# Patient Record
Sex: FEMALE | Race: WHITE | Employment: OTHER | URBAN - METROPOLITAN AREA
[De-identification: names, ages, dates, MRNs, and addresses within clinical notes are randomized per-mention and may not be internally consistent; named-entity substitution may affect disease eponyms.]

---

## 2020-05-11 ENCOUNTER — HOSPITAL ENCOUNTER (EMERGENCY)
Age: 75
Discharge: HOME | End: 2020-05-11
Attending: EMERGENCY MEDICINE
Payer: MEDICARE

## 2020-05-11 ENCOUNTER — HOSPITAL ENCOUNTER (EMERGENCY)
Dept: CT IMAGING | Age: 75
Discharge: HOME | End: 2020-05-11
Attending: EMERGENCY MEDICINE
Payer: MEDICARE

## 2020-05-11 VITALS
DIASTOLIC BLOOD PRESSURE: 72 MMHG | TEMPERATURE: 98.6 F | RESPIRATION RATE: 16 BRPM | SYSTOLIC BLOOD PRESSURE: 190 MMHG | HEART RATE: 60 BPM | OXYGEN SATURATION: 97 %

## 2020-05-11 DIAGNOSIS — S02.2XXA CLOSED FRACTURE OF NASAL BONE, INITIAL ENCOUNTER: Primary | ICD-10-CM

## 2020-05-11 PROCEDURE — 99284 EMERGENCY DEPT VISIT MOD MDM: CPT

## 2020-05-11 PROCEDURE — 70486 CT MAXILLOFACIAL W/O DYE: CPT

## 2020-05-11 PROCEDURE — 72125 CT NECK SPINE W/O DYE: CPT

## 2020-05-11 PROCEDURE — 70450 CT HEAD/BRAIN W/O DYE: CPT

## 2020-05-11 PROCEDURE — 74011250637 HC RX REV CODE- 250/637: Performed by: EMERGENCY MEDICINE

## 2020-05-11 RX ORDER — OXYCODONE AND ACETAMINOPHEN 5; 325 MG/1; MG/1
1 TABLET ORAL
Qty: 20 TAB | Refills: 0 | Status: SHIPPED | OUTPATIENT
Start: 2020-05-11 | End: 2020-05-18

## 2020-05-11 RX ORDER — DEUTETRABENAZINE 12 MG/1
12 TABLET, COATED ORAL 2 TIMES DAILY
COMMUNITY

## 2020-05-11 RX ORDER — ACETAMINOPHEN 325 MG/1
650 TABLET ORAL
COMMUNITY

## 2020-05-11 RX ORDER — OXYCODONE HYDROCHLORIDE 5 MG/1
TABLET ORAL
COMMUNITY

## 2020-05-11 RX ORDER — FACIAL-BODY WIPES
10 EACH TOPICAL EVERY 24 HOURS
COMMUNITY

## 2020-05-11 RX ORDER — OXYCODONE HYDROCHLORIDE 5 MG/1
5 TABLET ORAL
Status: COMPLETED | OUTPATIENT
Start: 2020-05-11 | End: 2020-05-11

## 2020-05-11 RX ORDER — ACETAMINOPHEN 500 MG
1000 TABLET ORAL
Status: COMPLETED | OUTPATIENT
Start: 2020-05-11 | End: 2020-05-11

## 2020-05-11 RX ORDER — SERTRALINE HYDROCHLORIDE 100 MG/1
100 TABLET, FILM COATED ORAL DAILY
COMMUNITY

## 2020-05-11 RX ORDER — IPRATROPIUM BROMIDE 21 UG/1
2 SPRAY, METERED NASAL 3 TIMES DAILY
COMMUNITY

## 2020-05-11 RX ORDER — BENAZEPRIL HYDROCHLORIDE 20 MG/1
20 TABLET ORAL DAILY
COMMUNITY

## 2020-05-11 RX ORDER — OLANZAPINE 10 MG/1
10 TABLET ORAL 2 TIMES DAILY
COMMUNITY

## 2020-05-11 RX ADMIN — ACETAMINOPHEN 1000 MG: 500 TABLET ORAL at 20:31

## 2020-05-11 RX ADMIN — OXYCODONE 5 MG: 5 TABLET ORAL at 22:15

## 2020-05-11 NOTE — ED PROVIDER NOTES
EMERGENCY DEPARTMENT HISTORY AND PHYSICAL EXAM    Date: 5/11/2020  Patient Name: Néstor Hinson    History of Presenting Illness     Chief Complaint   Patient presents with    Fall         History Provided By: Patient    Additional History (Context):   7:49 PM  Néstor Hinson is a 67 y.o. female with PMHX of HTN, bipolar schizoaffective disorder who presents to the emergency department C/O facial injury after falling. She states that she is very unsteady on her feet and falls often. She denied any prodromal HA, CP, vertigo prior to her fall. She is unsure if she suffered LOC and complains only of facial pain and frontal headache. No N/V, no extremity pain, no visual changes, no neck pain. Social History  Denies smoking drinking or drugs      Family History  Uncertain per patient      PCP: James Frazier MD    Current Outpatient Medications   Medication Sig Dispense Refill    acetaminophen (TYLENOL) 325 mg tablet Take 650 mg by mouth every six (6) hours as needed for Pain.  deutetrabenazine (Austedo) 12 mg tab Take 12 mg by mouth two (2) times a day.  benazepriL (LOTENSIN) 20 mg tablet Take 20 mg by mouth daily.  bisacodyL (DULCOLAX) 10 mg suppository Insert 10 mg into rectum every twenty-four (24) hours. prn   Indications: constipation      ipratropium (ATROVENT) 0.03 % nasal spray 2 Sprays by Both Nostrils route three (3) times daily.  multivitamin (Multiple Vitamin-Minerals) tablet Take 1 Tab by mouth daily.  naloxone HCl (NALOXONE INJECTION) by Injection route as needed (excessive sedation due to opioid). Auto injection      OLANZapine (ZyPREXA) 10 mg tablet Take 10 mg by mouth two (2) times a day. Indications: schizophrenia      oxyCODONE IR (Roxicodone) 5 mg immediate release tablet Take  by mouth every six (6) hours as needed for Pain. 2.5-5 mg severe pain   Indications: pain      sertraline (ZOLOFT) 100 mg tablet Take 100 mg by mouth daily.       oxyCODONE-acetaminophen (Percocet) 5-325 mg per tablet Take 1 Tab by mouth every four (4) hours as needed for Pain for up to 7 days. Max Daily Amount: 6 Tabs. Take 1 tablet every 4-6 hours as needed for pain control. If you were instructed to try over the counter ibuprofen or tylenol, only take the percocet for pain not controlled with the over the counter medication. 20 Tab 0    guaiFENesin-dextromethorphan SR (Mucinex DM) 600-30 mg per tablet Take 1 Tab by mouth every twelve (12) hours as needed for Cough. 30 Tab 0       Past History     Past Medical History:  Past Medical History:   Diagnosis Date    Bipolar affective ([address]     Cardiac murmur     Major depressive disorder     Schizophrenia ([facility] [facility]     Tibial fracture        Past Surgical History:  Past Surgical History:   Procedure Laterality Date    HX CHOLECYSTECTOMY         Family History:  History reviewed. No pertinent family history. Social History:  Social History     Tobacco Use    Smoking status: Former Smoker    Smokeless tobacco: Never Used   Substance Use Topics    Alcohol use: Not Currently    Drug use: Not Currently       Allergies: Allergies   Allergen Reactions    Clonazepam Unknown (comments)         Review of Systems   Review of Systems   Constitutional: Negative for activity change and fatigue. HENT: Positive for facial swelling and rhinorrhea. Negative for nosebleeds. Eyes: Negative for discharge and visual disturbance. Respiratory: Negative for apnea, cough, chest tightness and shortness of breath. Cardiovascular: Negative for chest pain and palpitations. Neurological: Negative for facial asymmetry and headaches. Hematological: Does not bruise/bleed easily. Psychiatric/Behavioral: Positive for confusion and decreased concentration. Negative for self-injury. The patient is not nervous/anxious. Slowness and diminished concentration are chronic   All other systems reviewed and are negative.         Physical Exam     Vitals: 05/11/20 1922 05/11/20 2135   BP: 140/61 190/72   Pulse: 60    Resp: 16    Temp: 98.6 °F (37 °C)    SpO2: 97%      Physical Exam  Vitals signs and nursing note reviewed. Constitutional:       General: She is not in acute distress. Appearance: She is well-developed. She is not ill-appearing, toxic-appearing or diaphoretic. HENT:      Head: Normocephalic and atraumatic. Right Ear: Tympanic membrane, ear canal and external ear normal. No hemotympanum. Tympanic membrane is not injected. Left Ear: Tympanic membrane, ear canal and external ear normal. No hemotympanum. Tympanic membrane is not injected. Nose: Nasal deformity, signs of injury, nasal tenderness and mucosal edema present. No septal deviation, laceration or rhinorrhea. Right Nostril: No epistaxis or septal hematoma. Left Nostril: No epistaxis or septal hematoma. Right Sinus: Maxillary sinus tenderness and frontal sinus tenderness present. Left Sinus: Maxillary sinus tenderness and frontal sinus tenderness present. Comments: No septal hematoma, no intranasal bleeding     Mouth/Throat:      Mouth: No injury. Pharynx: No oropharyngeal exudate. Comments: No intraoral or dental injuries  Eyes:      General: Lids are normal. Vision grossly intact. No scleral icterus. Right eye: No discharge. Left eye: No discharge. Extraocular Movements:      Right eye: Normal extraocular motion and no nystagmus. Left eye: Normal extraocular motion and no nystagmus. Conjunctiva/sclera: Conjunctivae normal.      Right eye: No hemorrhage. Left eye: No hemorrhage. Pupils: Pupils are equal, round, and reactive to light. Comments: No pallor   Neck:      Musculoskeletal: Neck supple. Normal range of motion. No neck rigidity, pain with movement, spinous process tenderness or muscular tenderness. Thyroid: No thyromegaly. Vascular: No JVD. Trachea: No tracheal deviation. Cardiovascular:      Rate and Rhythm: Normal rate and regular rhythm. Heart sounds: Normal heart sounds. Pulmonary:      Effort: Pulmonary effort is normal. No respiratory distress. Breath sounds: Normal breath sounds. No stridor. Abdominal:      General: Bowel sounds are normal. There is no distension. Palpations: Abdomen is soft. Tenderness: There is no abdominal tenderness. There is no guarding or rebound. Musculoskeletal: Normal range of motion. General: No tenderness. Comments: No soft tissue injuries   Lymphadenopathy:      Cervical: No cervical adenopathy. Skin:     General: Skin is warm and dry. Findings: No erythema or rash. Neurological:      Mental Status: She is alert and oriented to person, place, and time. Cranial Nerves: No cranial nerve deficit. Coordination: Coordination normal.      Deep Tendon Reflexes: Reflexes are normal and symmetric. Reflexes normal.   Psychiatric:         Behavior: Behavior normal.         Thought Content: Thought content normal.         Judgment: Judgment normal.         Diagnostic Study Results     Labs -   No results found for this or any previous visit (from the past 12 hour(s)). Radiologic Studies -   CT HEAD WO CONT   Final Result   IMPRESSION:      No acute intracranial abnormalities. CT MAXILLOFACIAL WO CONT   Final Result   IMPRESSION:      Comminuted left nasal bone fracture with soft tissue swelling of the nose and   left cheek. CT SPINE CERV WO CONT   Final Result   IMPRESSION:      No acute fractures or listhesis. Multilevel spondylosis and spinal stenosis as described.         CT Results  (Last 48 hours)    None        CXR Results  (Last 48 hours)    None          Medications given in the ED-  Medications   acetaminophen (TYLENOL) tablet 1,000 mg (1,000 mg Oral Given 5/11/20 2031)   oxyCODONE IR (ROXICODONE) tablet 5 mg (5 mg Oral Given 5/11/20 2215)         Medical Decision Making   I am the first provider for this patient. I reviewed the vital signs, available nursing notes, past medical history, past surgical history, family history and social history. Vital Signs-Reviewed the patient's vital signs. Pulse Oximetry Analysis - 97% on room air      --------------------------------------------------------  Records Reviewed: NURSING NOTES AND PREVIOUS MEDICAL RECORDS    CTA HEAD and NECK W/WO CONTRAST    COMPLETED DATE/TIME:  10/29/2019 1:40 pm    REASON FOR EXAM:  slured speech; slurred speech    COMPARISON:  None    RADIATION DOSE:  DLP 2356 mGy x cm. Dose reduction technique using automated exposure  control. TECHNIQUE:  Computed tomography of the brain, neck and upper chest was done with scans  being obtained from the mid calvarial region to the top of the aortic arch  transaxially. Scans were obtained in conjunction with intravenous administration of non-  ionic contrast.  Rapid bolus intravenous contrast administration was  performed in time association with the axial images in order to provide  maximum vascular opacification for a volumetric data set serving as a  source for 3-D angiographic images post processing utilizing MIPP, thin  section MIPP, and/or surface shaded renderings.  Two-dimensional and three-  dimensional reconstructions were obtained on a workstation in multiple  projections to assess the vascular anatomy. REPORT:  CT BRAIN:  No intracranial mass, edema or hemorrhage is seen. Abby Velez is no epidural or  subdural fluid collection.  No abnormal contrast enhancement is seen in the  brain.  No abnormality of the base of the skull or calvarium is seen. There is no evidence of sinusitis or mastoiditis. AORTIC ARCH: Origins of the great vessels are unremarkable.  No arch  anomalies are noted. RIGHT CAROTID ARTERY: Abby Velez is about 30% diameter stenosis in the origin  of the right internal carotid artery.     LEFT CAROTID ARTERY:  No carotid artery stenosis is seen on the left. VERTEBRAL ARTERIES:  The vertebral arteries are patent bilaterally with no  stenosis evident.  The left vertebral artery is dominant. INTRACRANIAL CIRCULATION:  The distal internal carotid arteries and basilar  artery have normal caliber.  No significant abnormality of the distal  vertebral arteries is visualized. No significant vessel stenosis is  identified within the [place].  The posterior communicating  arteries are not visualized and may be developmentally absent. No intracranial vascular occlusion is seen.  The right A1 segment is  smaller in caliber than the left. There is focal stenosis in the distal aspect of the right M1 segment. There is focal stenosis in the proximal and distal aspects of the right P1  segment. No aneurysm or significant vascular malformation is identified by CT  angiography at the level of the [place]. No venous circulation abnormalities are noted. Complete ECHO - JAN 9, 2020  · There is mild concentric LV hypertrophy with normal LV systolic   function, EF 65 - 70%, and mild diastolic dysfunction. No dynamic outflow   tract obstruction  · Mild mitral annular calcification  · Very mild aortic valve sclerosis without stenosis  · Typical diastolic filling pattern for this age group  · Normal right heart    Provider Notes (Medical Decision Making):   Extensive previous work ups for arrhythmias, valvular heart disease and CHF have all shown minimal changes, unlikely to be contribution to her falls, episode today does not seem to suggest need for another workup, but to examine for injuries from the fall. TC Head, face, C-spine due to age, although she does pass NEXUS criteria (her affect and demeanor do not seem to suggest long term baseline of the standard population, her age and psych dx lead one to scan for neck injuries though she meets criteria by definition.   By exam, there is no evidence of an open nasal bone fracture so no abx started at this time. Procedures:  Procedures    ED Course:   7:49 PM: Initial assessment performed. The patients presenting problems have been discussed, and they are in agreement with the care plan formulated and outlined with them. I have encouraged them to ask questions as they arise throughout their visit. Diagnosis and Disposition       DISCHARGE NOTE:  9:20 PM  Alex Lopes  results have been reviewed with her. She has been counseled regarding her diagnosis, treatment, and plan. She verbally conveys understanding and agreement of the signs, symptoms, diagnosis, treatment and prognosis and additionally agrees to follow up as discussed. She also agrees with the care-plan and conveys that all of her questions have been answered. I have also provided discharge instructions for her that include: educational information regarding their diagnosis and treatment, and list of reasons why they would want to return to the ED prior to their follow-up appointment, should her condition change. She has been provided with education for proper emergency department utilization. CLINICAL IMPRESSION:    1. Closed fracture of nasal bone, initial encounter        PLAN:  1. D/C Home  2. Discharge Medication List as of 5/11/2020  9:40 PM      START taking these medications    Details   oxyCODONE-acetaminophen (Percocet) 5-325 mg per tablet Take 1 Tab by mouth every four (4) hours as needed for Pain for up to 7 days. Max Daily Amount: 6 Tabs. Take 1 tablet every 4-6 hours as needed for pain control. If you were instructed to try over the counter ibuprofen or tylenol, only take the percocet  for pain not controlled with the over the counter medication. , Print, Disp-20 Tab, R-0      guaiFENesin-dextromethorphan SR (Mucinex DM) 600-30 mg per tablet Take 1 Tab by mouth every twelve (12) hours as needed for Cough. , Print, Disp-30 Tab, R-0         CONTINUE these medications which have NOT CHANGED    Details acetaminophen (TYLENOL) 325 mg tablet Take 650 mg by mouth every six (6) hours as needed for Pain., Historical Med      deutetrabenazine (Austedo) 12 mg tab Take 12 mg by mouth two (2) times a day., Historical Med      benazepriL (LOTENSIN) 20 mg tablet Take 20 mg by mouth daily. , Historical Med      bisacodyL (DULCOLAX) 10 mg suppository Insert 10 mg into rectum every twenty-four (24) hours. prn   Indications: constipation, Historical Med      ipratropium (ATROVENT) 0.03 % nasal spray 2 Sprays by Both Nostrils route three (3) times daily. , Historical Med      multivitamin (Multiple Vitamin-Minerals) tablet Take 1 Tab by mouth daily. , Historical Med      naloxone HCl (NALOXONE INJECTION) by Injection route as needed (excessive sedation due to opioid). Auto injection, Historical Med      OLANZapine (ZyPREXA) 10 mg tablet Take 10 mg by mouth two (2) times a day. Indications: schizophrenia, Historical Med      oxyCODONE IR (Roxicodone) 5 mg immediate release tablet Take  by mouth every six (6) hours as needed for Pain. 2.5-5 mg severe pain   Indications: pain, Historical Med      sertraline (ZOLOFT) 100 mg tablet Take 100 mg by mouth daily. , Historical Med           3. Follow-up Information     Follow up With Specialties Details Why Contact Info    Leroy Frazier MD [facility] In 1 week  [address] [address] [address] [place] [address] [place]      Renetta Yan MD Otolaryngology, Surgery In 1 week  [address] [place] [id number]  [phone]      [facility] EMERGENCY DEPT Emergency Medicine  As needed [address] [address] [phone]        _______________________________    This note was partially transcribed via voice recognition software. Although efforts have been made to catch any discrepancies, it may contain sound alike words, grammatical errors, or nonsensical words.

## 2020-05-11 NOTE — ED TRIAGE NOTES
Per EMS report pt fell face forward approx 30 min pta at the [facility]. Pt states she slipped and fell. states pain in nose. Pt has brushing, swelling,  and sl abrasion to the bridge of her nose. States pain to right foot/boot in place. States hx of ankle, unsure as to when. Alert and conversing.

## 2020-05-12 NOTE — ED NOTES
Dr. Chery Head aware of pt b/p 190/72. States pt may be d/c'd home, but that we will medicate for pain prior to discharge. Pt is asymptomatic with no neurological changes or c/o headache.

## 2020-05-12 NOTE — ED NOTES
D/c'd home via [facility]. Pt is alert, conversing and in NAd. Report was called to [facility], nurse at [facility]. She is aware of fx. Nose impending return and medications oxycodone and tylenol given. D/c papers were sent with the patient as well. She is alert and in NAD at discharge.

## 2020-05-12 NOTE — DISCHARGE INSTRUCTIONS
Patient Education        Facial Fracture: Care Instructions  Your Care Instructions  You have broken (fractured) one or more bones in your face. Swelling and bruising from the injury are likely to get worse over the first couple of days. After that, the swelling should steadily improve until it is gone. If you have bruises on your face, they may change as they heal. The skin may turn from black and blue to green to yellow or brown before it returns to its normal color. It may take several weeks for your injury to heal.  It is very important that you get follow-up care as directed so that the injury heals properly and does not lead to problems. The kind of care and treatment you will need depends on the specific type of break (or breaks) you have. You heal best when you take good care of yourself. Eat a variety of healthy foods, and don't smoke. Follow-up care is a key part of your treatment and safety. Be sure to make and go to all appointments, and call your doctor if you are having problems. It's also a good idea to know your test results and keep a list of the medicines you take. How can you care for yourself at home? · Put ice or a cold pack on your injury for 10 to 20 minutes at a time. Try to do this every 1 to 2 hours for the next 3 days (when you are awake) or until the swelling goes down. Put a thin cloth between the ice pack and your skin. · Go to all follow-up appointments with your doctor. Your doctor will determine whether you need further treatment, including surgery. · Take your medicines exactly as prescribed. Call your doctor if you think you are having a problem with your medicine. You will get more details on the specific medicines your doctor prescribes. · If your doctor prescribed antibiotics, take them exactly as directed. Do not stop taking them just because you feel better. You need to take the full course of antibiotics. · Be safe with medicines.  Read and follow all instructions on the label. ? If the doctor gave you a prescription medicine for pain, take it as prescribed. ? If you are not taking a prescription pain medicine, ask your doctor if you can take an over-the-counter medicine. · Keep your head elevated when you sleep. · Eat soft food to decrease jaw pain. · Do not blow your nose. Dab it with a tissue if you need to. When should you call for help? Call 911 anytime you think you may need emergency care. For example, call if:    · You have a seizure.     · You passed out (lost consciousness).     · You have tingling, weakness, or numbness on one side of your body.    Call your doctor now or seek immediate medical care if:    · You have a severe headache.     · You develop double vision.     · You have a fever and stiff neck.     · Clear, watery fluid drains from your nose.     · You feel dizzy or lightheaded.     · You have new eye pain or changes in your vision, such as blurring.     · You have new ear pain, ringing in your ears, or trouble hearing.     · You are confused, irritable, or not acting normally.     · You have a hard time standing, walking, or talking.     · You have new mouth or tooth pain, or you have trouble chewing.     · You have increasing pain even after you have taken your pain medicine.    Watch closely for changes in your health, and be sure to contact your doctor if:    · You develop a cough, cold, or sinus infection.     · The symptoms from your injury are not steadily improving. Where can you learn more? Go to http://waldo-luis.info/  Enter M349 in the search box to learn more about \"Facial Fracture: Care Instructions. \"  Current as of: November 19, 2019Content Version: 12.4  © 2448-0472 Healthwise, Incorporated. Care instructions adapted under license by [employer] (which disclaims liability or warranty for this information).  If you have questions about a medical condition or this instruction, always ask your healthcare professional. [address] [address] any warranty or liability for your use of this information. Patient Education        Broken Nose: Care Instructions  Your Care Instructions    A broken nose is a break, or fracture, of the bone or cartilage. Most broken noses need only home care and a follow-up visit with a doctor. The swelling should go down in a few days. Bruises around your eyes and nose should go away in 2 to 3 weeks. You heal best when you take good care of yourself. Eat a variety of healthy foods, and don't smoke. Follow-up care is a key part of your treatment and safety. Be sure to make and go to all appointments, and call your doctor if you are having problems. It's also a good idea to know your test results and keep a list of the medicines you take. How can you care for yourself at home? · If you have a nasal splint or packing, leave it in place until a doctor removes it. · If your doctor prescribed antibiotics, take them as directed. Do not stop taking them just because you feel better. You need to take the full course of antibiotics. · Take decongestants as directed to help you breathe after the splint or packing is removed. Your doctor may give you a prescription or suggest over-the-counter medicine. · Be safe with medicines. Take pain medicines exactly as directed. ? If the doctor gave you a prescription medicine for pain, take it as prescribed. ? If you are not taking a prescription pain medicine, ask your doctor if you can take an over-the-counter medicine. · Put ice or a cold pack on your nose for 10 to 20 minutes at a time. Try to do this every 1 to 2 hours for the first 3 days (when you are awake) or until the swelling goes down. Put a thin cloth between the ice pack and your skin. · Sleep with your head slightly raised until the swelling goes down. Prop up your head and shoulders on pillows. · Do not play contact sports for 6 weeks.   When should you call for help? Call 911 anytime you think you may need emergency care. For example, call if:    · You have trouble breathing.     · You passed out (lost consciousness).    Call your doctor now or seek immediate medical care if:    · You have signs of infection, such as:  ? Increased pain, swelling, warmth, or redness. ? Red streaks leading from the area. ? Pus draining from the area. ? A fever.     · You have clear fluid draining from your nose.     · You have vision changes.     · Your nose is bleeding.     · You have new or worse pain.    Watch closely for changes in your health, and be sure to contact your doctor if:    · You do not get better as expected. Where can you learn more? Go to http://waldo-luis.info/  Enter Y345 in the search box to learn more about \"Broken Nose: Care Instructions. \"  Current as of: June 26, 2019Content Version: 12.4  © 4091-0028 Healthwise, Incorporated. Care instructions adapted under license by [employer] (which disclaims liability or warranty for this information). If you have questions about a medical condition or this instruction, always ask your healthcare professional. [address] [address] any warranty or liability for your use of this information.

## 2020-05-18 ENCOUNTER — HOSPITAL ENCOUNTER (OUTPATIENT)
Dept: CT IMAGING | Age: 75
Discharge: HOME | End: 2020-05-18
Attending: EMERGENCY MEDICINE
Payer: MEDICARE

## 2020-05-18 ENCOUNTER — HOSPITAL ENCOUNTER (EMERGENCY)
Age: 75
Discharge: HOME | End: 2020-05-18
Attending: EMERGENCY MEDICINE
Payer: MEDICARE

## 2020-05-18 VITALS
RESPIRATION RATE: 18 BRPM | DIASTOLIC BLOOD PRESSURE: 95 MMHG | HEART RATE: 76 BPM | BODY MASS INDEX: 26.81 KG/M2 | TEMPERATURE: 98.3 F | HEIGHT: 61 IN | WEIGHT: 142 LBS | SYSTOLIC BLOOD PRESSURE: 151 MMHG | OXYGEN SATURATION: 98 %
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
## 2020-05-18 DIAGNOSIS — S01.81XA FOREHEAD LACERATION, INITIAL ENCOUNTER: ICD-10-CM

## 2020-05-18 PROCEDURE — 90471 IMMUNIZATION ADMIN: CPT

## 2020-05-18 PROCEDURE — 77030018836 HC SOL IRR NACL ICUM -A

## 2020-05-18 PROCEDURE — 90715 TDAP VACCINE 7 YRS/> IM: CPT | Performed by: EMERGENCY MEDICINE

## 2020-05-18 PROCEDURE — 70450 CT HEAD/BRAIN W/O DYE: CPT

## 2020-05-18 PROCEDURE — 75810000293 HC SIMP/SUPERF WND  RPR

## 2020-05-18 PROCEDURE — 99284 EMERGENCY DEPT VISIT MOD MDM: CPT

## 2020-05-18 PROCEDURE — 74011000250 HC RX REV CODE- 250: Performed by: PHYSICIAN ASSISTANT

## 2020-05-18 PROCEDURE — 74011250637 HC RX REV CODE- 250/637: Performed by: EMERGENCY MEDICINE

## 2020-05-18 PROCEDURE — 74011250636 HC RX REV CODE- 250/636: Performed by: EMERGENCY MEDICINE

## 2020-05-18 PROCEDURE — 77030002916 HC SUT ETHLN J&J -A

## 2020-05-18 RX ORDER — LIDOCAINE HYDROCHLORIDE AND EPINEPHRINE 10; 10 MG/ML; UG/ML
10 INJECTION, SOLUTION INFILTRATION; PERINEURAL ONCE
Status: DISCONTINUED | OUTPATIENT
Start: 2020-05-18 | End: 2020-05-18 | Stop reason: HOSPADM

## 2020-05-18 RX ORDER — ACETAMINOPHEN 325 MG/1
650 TABLET ORAL ONCE
Status: COMPLETED | OUTPATIENT
Start: 2020-05-18 | End: 2020-05-18

## 2020-05-18 RX ORDER — BACITRACIN 500 [USP'U]/G
OINTMENT TOPICAL
Status: COMPLETED | OUTPATIENT
Start: 2020-05-18 | End: 2020-05-18

## 2020-05-18 RX ADMIN — BACITRACIN: 500 OINTMENT TOPICAL at 16:10

## 2020-05-18 RX ADMIN — ACETAMINOPHEN 650 MG: 325 TABLET ORAL at 16:09

## 2020-05-18 RX ADMIN — TETANUS TOXOID, REDUCED DIPHTHERIA TOXOID AND ACELLULAR PERTUSSIS VACCINE, ADSORBED 0.5 ML: 5; 2.5; 8; 8; 2.5 SUSPENSION INTRAMUSCULAR at 17:36

## 2020-05-18 NOTE — ED TRIAGE NOTES
Pt reports to ED via EMS for a fall. EMS reports last week she fell and broke her nose and today she fell and has a laceration above the right eye. EMS reports that there was no LOC, but she did hit her head on the ground. Pt reports a pain level of 8/10. She is ANO X4 and is speaking in clear/full sentences.

## 2020-05-18 NOTE — ED PROVIDER NOTES
EMERGENCY DEPARTMENT HISTORY AND PHYSICAL EXAM    Date: 5/18/2020  Patient Name: Za Naik    History of Presenting Illness     Chief Complaint   Patient presents with    Fall         History Provided By: Patient and EMS    2:04 PM  Za Naik is a 67 y.o. female with PMHX of frequent falls who presents to the emergency department C/O head wound. Per patient she slipped and fell today and struck her head on the ground. She reports she is wearing a boot on her right leg from a fall last week which she broke her nose in the boot causes her to slip and fall frequently. She denies any loss of consciousness, neck pain, back pain, numbness or weakness. She does not take any blood thinners. She is not sure when her last tetanus shot was. She denies any other complaints. PCP: Beto Miller MD    Current Facility-Administered Medications   Medication Dose Route Frequency Provider Last Rate Last Dose    diph,Pertuss(AC),Tet Vac-PF (BOOSTRIX) suspension 0.5 mL  0.5 mL IntraMUSCular PRIOR TO DISCHARGE Riri Rodriguez MD        lidocaine-EPINEPHrine (XYLOCAINE) 1 %-1:100,000 injection 100 mg  10 mL IntraDERMal ONCE Riri Rodriguez MD         Current Outpatient Medications   Medication Sig Dispense Refill    acetaminophen (TYLENOL) 325 mg tablet Take 650 mg by mouth every six (6) hours as needed for Pain.  deutetrabenazine (Austedo) 12 mg tab Take 12 mg by mouth two (2) times a day.  benazepriL (LOTENSIN) 20 mg tablet Take 20 mg by mouth daily.  bisacodyL (DULCOLAX) 10 mg suppository Insert 10 mg into rectum every twenty-four (24) hours. prn   Indications: constipation      ipratropium (ATROVENT) 0.03 % nasal spray 2 Sprays by Both Nostrils route three (3) times daily.  multivitamin (Multiple Vitamin-Minerals) tablet Take 1 Tab by mouth daily.  naloxone HCl (NALOXONE INJECTION) by Injection route as needed (excessive sedation due to opioid).  Auto injection      OLANZapine (ZyPREXA) 10 mg tablet Take 10 mg by mouth two (2) times a day. Indications: schizophrenia      oxyCODONE IR (Roxicodone) 5 mg immediate release tablet Take  by mouth every six (6) hours as needed for Pain. 2.5-5 mg severe pain   Indications: pain      sertraline (ZOLOFT) 100 mg tablet Take 100 mg by mouth daily.  oxyCODONE-acetaminophen (Percocet) 5-325 mg per tablet Take 1 Tab by mouth every four (4) hours as needed for Pain for up to 7 days. Max Daily Amount: 6 Tabs. Take 1 tablet every 4-6 hours as needed for pain control. If you were instructed to try over the counter ibuprofen or tylenol, only take the percocet for pain not controlled with the over the counter medication. 20 Tab 0    guaiFENesin-dextromethorphan SR (Mucinex DM) 600-30 mg per tablet Take 1 Tab by mouth every twelve (12) hours as needed for Cough. 30 Tab 0       Past History     Past Medical History:  Past Medical History:   Diagnosis Date    Bipolar affective ([facility] [facility]     Cardiac murmur     Major depressive disorder     Schizophrenia ([facility]     Tibial fracture        Past Surgical History:  Past Surgical History:   Procedure Laterality Date    HX CHOLECYSTECTOMY         Family History:  No family history on file. Social History:  Social History     Tobacco Use    Smoking status: Former Smoker    Smokeless tobacco: Never Used   Substance Use Topics    Alcohol use: Not Currently    Drug use: Not Currently       Allergies: Allergies   Allergen Reactions    Clonazepam Unknown (comments)         Review of Systems   Review of Systems   Constitutional: Negative for fever. Respiratory: Negative for shortness of breath. Cardiovascular: Negative for chest pain. Gastrointestinal: Negative for abdominal pain. Skin: Positive for wound. Neurological: Negative for syncope. All other systems reviewed and are negative.         Physical Exam     Vitals:    05/18/20 1410   BP: 158/53   Pulse: 76   Resp: 18   Temp: 98.3 °F (36.8 °C)   SpO2: 96%   Weight: 64.4 kg (142 lb)   Height: 5' 1\" (1.549 m)     Physical Exam    Nursing notes and vital signs reviewed    Constitutional: Non toxic appearing, no acute distress  Head: Normocephalic, 3 cm laceration above the right eyebrow  Eyes: Pupils are equal, round, and reactive to light, EOMI  Neck: Supple, no spinal tenderness to palpation  Cardiovascular: Regular rate and rhythm, no murmurs, rubs, or gallops  Chest: Normal work of breathing and chest excursion bilaterally  Lungs: Clear to ausculation bilaterally  Abdomen: Soft, non tender, non distended  Back: No evidence of trauma or deformity, no spinal tenderness to palpation  Extremities: Lower extremity with Cam boot in place  Skin: Warm and dry, normal cap refill  Neuro: Alert and appropriate, CN intact, normal speech, strength and sensation symmetric bilaterally, normal coordination  Psychiatric: Normal mood and affect      Diagnostic Study Results     Labs -   No results found for this or any previous visit (from the past 12 hour(s)). Radiologic Studies -   CT HEAD WO CONT   Final Result   IMPRESSION:      No acute intracranial abnormalities. CT Results  (Last 48 hours)               05/18/20 1622  CT HEAD WO CONT Final result    Impression:  IMPRESSION:       No acute intracranial abnormalities. Narrative:  EXAM: CT head       INDICATION: Head trauma, headache       COMPARISON: None. TECHNIQUE: Axial CT imaging of the head was performed without intravenous   contrast. One or more dose reduction techniques were used on this CT: automated   exposure control, adjustment of the mAs and/or kVp according to patient size,   and iterative reconstruction techniques. The specific techniques used on this   CT exam have been documented in the patient's electronic medical record.  Digital   Imaging and Communications in Medicine (DICOM) format image data are available   to nonaffiliated external healthcare facilities or entities on a secure, media   free, reciprocally searchable basis with patient authorization for at least a   12-month period after this study. _______________       FINDINGS:       BRAIN AND POSTERIOR FOSSA: The sulci, folia, ventricles and basal cisterns are   within normal limits for the patient's age. There is no intracranial hemorrhage,   mass effect, or midline shift. There are no areas of abnormal parenchymal   attenuation. EXTRA-AXIAL SPACES AND MENINGES: There are no abnormal extra-axial fluid   collections. CALVARIUM: Intact. SINUSES: Clear. OTHER: None.       _______________               CXR Results  (Last 48 hours)    None          Medications given in the ED-  Medications   diph,Pertuss(AC),Tet Vac-PF (BOOSTRIX) suspension 0.5 mL (has no administration in time range)   lidocaine-EPINEPHrine (XYLOCAINE) 1 %-1:100,000 injection 100 mg (has no administration in time range)   bacitracin 500 unit/gram ointment ( Topical Given 5/18/20 1610)   acetaminophen (TYLENOL) tablet 650 mg (650 mg Oral Given 5/18/20 1609)         Medical Decision Making   I am the first provider for this patient. I reviewed the vital signs, available nursing notes, past medical history, past surgical history, family history and social history. Vital Signs-Reviewed the patient's vital signs. Records Reviewed: Nursing Notes and Old Medical Records    Provider Notes (Medical Decision Making): Sirena Arenas is a 67 y.o. female presenting after mechanical fall resulting in a forehead laceration. Patient is not on blood thinners and CT without acute abnormality. Neurologically intact on exam.  Wound was cleaned and repaired and tetanus updated. Plan for discharge with early primary care follow-up, return in 7 days for suture removal, and return precautions.     Procedures:  Wound Repair  Date/Time: 5/18/2020 3:20 PM  Supervising provider: Dr. Theresa Mcnally  Preparation: sterile field established and skin prepped with Mojgan  Location details: scalp  Wound length:2.6 - 7.5 cm  Anesthesia: local infiltration    Anesthesia:  Local Anesthetic: lidocaine 1% with epinephrine  Anesthetic total: 3 mL  Foreign bodies: no foreign bodies  Irrigation solution: saline  Irrigation method: syringe  Debridement: none  Skin closure: 5-0 nylon  Number of sutures: 7  Technique: simple  Approximation: close  Dressing: antibiotic ointment and gauze roll  Patient tolerance: Patient tolerated the procedure well with no immediate complications  My total time at bedside, performing this procedure was 1-15 minutes. ED Course:       Diagnosis and Disposition     Critical Care: None    DISCHARGE NOTE:    Tina Long  results have been reviewed with her. She has been counseled regarding her diagnosis, treatment, and plan. She verbally conveys understanding and agreement of the signs, symptoms, diagnosis, treatment and prognosis and additionally agrees to follow up as discussed. She also agrees with the care-plan and conveys that all of her questions have been answered. I have also provided discharge instructions for her that include: educational information regarding their diagnosis and treatment, and list of reasons why they would want to return to the ED prior to their follow-up appointment, should her condition change. She has been provided with education for proper emergency department utilization. CLINICAL IMPRESSION:    1. Fall, initial encounter    2. Forehead laceration, initial encounter        PLAN:  1. D/C Home  2. Current Discharge Medication List        3.    Follow-up Information     Follow up With Specialties Details Why Contact Info    Erwin Crews MD Family Practice Schedule an appointment as soon as possible for a visit  [address] [address]  [phone]      THE [facility] EMERGENCY DEPT Emergency Medicine In 7 days For suture removal [address] [facility] [address]  [phone] _______________________________      Please note that this dictation was completed with [employer], the computer voice recognition software. Quite often unanticipated grammatical, syntax, homophones, and other interpretive errors are inadvertently transcribed by the computer software. Please disregard these errors. Please excuse any errors that have escaped final proofreading.

## 2022-12-15 ENCOUNTER — HOSPITAL ENCOUNTER (EMERGENCY)
Age: 77
Discharge: NURSING, REHAB OR LONG-TERM CARE | End: 2022-12-15
Attending: EMERGENCY MEDICINE
Payer: MEDICARE

## 2022-12-15 ENCOUNTER — APPOINTMENT (OUTPATIENT)
Dept: GENERAL RADIOLOGY | Age: 77
End: 2022-12-15
Attending: PHYSICIAN ASSISTANT
Payer: MEDICARE

## 2022-12-15 VITALS
BODY MASS INDEX: 18.88 KG/M2 | OXYGEN SATURATION: 97 % | TEMPERATURE: 97.6 F | DIASTOLIC BLOOD PRESSURE: 101 MMHG | SYSTOLIC BLOOD PRESSURE: 146 MMHG | HEART RATE: 100 BPM | WEIGHT: 100 LBS | RESPIRATION RATE: 20 BRPM | HEIGHT: 61 IN

## 2022-12-15 DIAGNOSIS — R09.89 CHOKING EPISODE: Primary | ICD-10-CM

## 2022-12-15 PROCEDURE — 99283 EMERGENCY DEPT VISIT LOW MDM: CPT

## 2022-12-15 PROCEDURE — 71046 X-RAY EXAM CHEST 2 VIEWS: CPT

## 2022-12-15 RX ORDER — ALENDRONATE SODIUM 70 MG/1
TABLET ORAL
COMMUNITY

## 2022-12-15 RX ORDER — BENZTROPINE MESYLATE 0.5 MG/1
0.5 TABLET ORAL
COMMUNITY

## 2022-12-15 RX ORDER — ZIPRASIDONE HYDROCHLORIDE 60 MG/1
60 CAPSULE ORAL 2 TIMES DAILY WITH MEALS
COMMUNITY

## 2022-12-15 RX ORDER — LOPERAMIDE HYDROCHLORIDE 2 MG/1
CAPSULE ORAL
COMMUNITY

## 2022-12-15 RX ORDER — ATORVASTATIN CALCIUM 80 MG/1
80 TABLET, FILM COATED ORAL DAILY
COMMUNITY

## 2022-12-15 RX ORDER — AMLODIPINE BESYLATE 5 MG/1
5 TABLET ORAL DAILY
COMMUNITY

## 2022-12-15 RX ORDER — CLONIDINE HYDROCHLORIDE 0.1 MG/1
TABLET ORAL 2 TIMES DAILY
COMMUNITY

## 2022-12-15 RX ORDER — LANOLIN ALCOHOL/MO/W.PET/CERES
3 CREAM (GRAM) TOPICAL
COMMUNITY

## 2022-12-15 RX ORDER — ASPIRIN 325 MG
325 TABLET ORAL DAILY
COMMUNITY

## 2022-12-15 RX ORDER — TRAZODONE HYDROCHLORIDE 100 MG/1
100 TABLET ORAL ONCE
COMMUNITY

## 2022-12-15 NOTE — DISCHARGE INSTRUCTIONS
Your evaluation here in the ER today did not show any continuing concerns after episode of choking earlier today.   Chew your food well,  If you develop shortness of breath, chest pain, cough or fever you should return for reevaluation  Return to ER if any new concerns

## 2022-12-15 NOTE — ED PROVIDER NOTES
EMERGENCY DEPARTMENT HISTORY & PHYSICAL EXAM    THE [facility] EMERGENCY DEPT  12/15/2022, 2:35 PM    Clinical Impression:  1. Choking episode        Assessment/Differential Diagnosis:     Ddx choking episode, aspiration pneumonia, esophageal foreign body all considered    ED Course:   Initial assessment performed. The patients presenting problems have been discussed, and they are in agreement with the care plan formulated and outlined with them. I have encouraged them to ask questions as they arise throughout their visit. Patient comes from local nursing home. At lunch she was sitting eating a ham sandwich. She began to choke. Yolislich procedure done X 1 by staff, with clearance of food. There was no loss of consciousness. As a precaution she was sent here for evaluation. Patient is having no symptoms at this time. She denies sore throat, shortness of breath, chest pain or abdominal pain. She feels her breathing is normal.    Exam with patient resting comfortably. Mouth with no signs of trauma or foreign body. No pain with palpation to the neck. Lungs are clear to auscultation bilaterally. No pain with palpation to the abdomen    Will give p.o. challenge  Chest x-ray with no concerning acute findings  Plan is to send patient back to her residence/nursing home. Precautions discussed         Medical Chart Review:  I have reviewed triage nursing documentation. Review of old medical records with the following pertinent information:       Disposition:  nursing home/residence, good condition. Chief Complaint   Patient presents with    Choking     HPI:    The history is provided by patient and staff. No  used. Lennox Pian is a 65 y.o. female presenting to the Emergency Department with complaints of episode of choking. Patient was sitting eating a ham sandwich. She began to choke on a piece of ham.  Patient did not become unresponsive.   Yolislich done by staff X 1 with clearance of food. transported here out of an abundance of caution. She denies any shortness of breath, cough, abdominal pain. I have reviewed all PMHX, FMHX and Social Hx as entered into the medical record in the chart below using the Epic Template. Review of Systems:  Constitutional: neg for fever, chills  ENT:  neg for URI symptoms  Respiratory:  + for cough, positive for shortness of breath- both with choking episode, resolved  Cardiovascular:  negative for chest pain. Neg for pedal/LE edema. GI:  neg for abdominal pain. No n/v/d.  :  No urinary symptoms. No Flank pain. MSK: neg for back pain. Integumentary: no rashes, or skin trauma  Neurological: neg for headaches  All other systems reviewed negative with exception of positives in ROS and HPI. Past Medical History:  Past Medical History:   Diagnosis Date    Bipolar affective ([facility] [address]     Cardiac murmur     Major depressive disorder     Schizophrenia ([facility] [address]     Tibial fracture        Past Surgical History:  Past Surgical History:   Procedure Laterality Date    HX CHOLECYSTECTOMY         Family History:  No family history on file. Social History:  Social History     Tobacco Use    Smoking status: Former    Smokeless tobacco: Never   Substance Use Topics    Alcohol use: Not Currently    Drug use: Not Currently       Allergies: Allergies   Allergen Reactions    Clonazepam Unknown (comments)       Vital Signs:  Vitals:    12/15/22 1331 12/15/22 1428 12/15/22 1437 12/15/22 1443   BP:   121/76 (!) 121/55   Pulse: (!) 2 82  61   Resp:    16   Temp: 97.6 °F (36.4 °C)      SpO2:    97%   Weight:       Height:         Physical Exam:  Vital Signs Reviewed. Nursing Notes Reviewed. Constitutional:  thin, frail, elderly female. Appearance and behavior are age and situation appropriate. Head: Normocephalic, Atraumatic  Eyes: Conjunctiva clear, lids normal. Sclera anicteric.    ENT:hearing grossly intact, throat clear   Neck:  supple, nontender  Lungs: No respiratory distress. Lungs CTAB . no cough. no pain with inspiration  CV:  RR&R without harsh systolic murmur  Thorax: no chest wall tenderness. No signs of trauma. Skin without rash. Abdomen: no tenderness with palpation. Diagnostics:    Labs -   No results found for this or any previous visit (from the past 12 hour(s)). Radiologic Studies -   XR CHEST PA LAT   Final Result      Mildly underexpanded lungs without superimposed acute radiographic abnormality. CT Results  (Last 48 hours)      None          CXR Results  (Last 48 hours)                 12/15/22 1341  XR CHEST PA LAT Final result    Impression:      Mildly underexpanded lungs without superimposed acute radiographic abnormality. Narrative:  EXAM: XR CHEST PA LAT       CLINICAL INDICATION/HISTORY: possible aspiration   -Additional: None       COMPARISON: None       TECHNIQUE: PA and lateral views of the chest       _______________       FINDINGS:       HEART AND MEDIASTINUM: Cardiac size and mediastinal contours are within normal   limits       LUNGS AND PLEURAL SPACES: No focal pneumonic consolidation, pneumothorax or   pleural effusion       BONY THORAX AND SOFT TISSUES: No acute osseous abnormality. 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 _______________                   Medications given in the ED-  Medications - No data to display    Please note that this dictation was completed with [employer], the computer voice recognition software. Quite often unanticipated grammatical, syntax, homophones, and other interpretive errors are inadvertently transcribed by the computer software. Please disregard these errors. Please excuse any errors that have escaped final proofreading.

## 2022-12-15 NOTE — ED TRIAGE NOTES
Pt arrives to ed per ems coming from [facility] reporting choking on a ham sandwich.  Airway clear upon arrival.

## 2022-12-15 NOTE — ED NOTES
Presented to the ED with choking on food at facility. Patient states she feels better now. Able to swallow water without difficulty.

## 2022-12-16 NOTE — ED NOTES
Report received form lilibeth rn, patient observed not in any obvious distress, picked up by sidney, back to nursing home in stable condition

## 2024-05-27 ENCOUNTER — HOSPITAL ENCOUNTER (EMERGENCY)
Facility: HOSPITAL | Age: 79
Discharge: HOME | End: 2024-05-27
Payer: MEDICARE

## 2024-05-27 ENCOUNTER — APPOINTMENT (OUTPATIENT)
Facility: HOSPITAL | Age: 79
End: 2024-05-27
Payer: MEDICARE

## 2024-05-27 VITALS
HEIGHT: 65 IN | BODY MASS INDEX: 17.49 KG/M2 | WEIGHT: 105 LBS | DIASTOLIC BLOOD PRESSURE: 128 MMHG | OXYGEN SATURATION: 97 % | RESPIRATION RATE: 18 BRPM | TEMPERATURE: 98.4 F | HEART RATE: 72 BPM | SYSTOLIC BLOOD PRESSURE: 167 MMHG

## 2024-05-27 DIAGNOSIS — S00.03XA HEMATOMA OF FRONTAL SCALP, INITIAL ENCOUNTER: ICD-10-CM

## 2024-05-27 DIAGNOSIS — S09.90XA CLOSED HEAD INJURY, INITIAL ENCOUNTER: Primary | ICD-10-CM

## 2024-05-27 DIAGNOSIS — S02.2XXA CLOSED FRACTURE OF NASAL BONE, INITIAL ENCOUNTER: ICD-10-CM
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
## 2024-05-27 LAB
ALBUMIN SERPL-MCNC: 3.2 G/DL (ref 3.4–5)
ALBUMIN/GLOB SERPL: 1 (ref 0.8–1.7)
ALP SERPL-CCNC: 66 U/L (ref 45–117)
ALT SERPL-CCNC: 22 U/L (ref 13–56)
ANION GAP SERPL CALC-SCNC: 6 MMOL/L (ref 3–18)
AST SERPL-CCNC: 23 U/L (ref 10–38)
BASOPHILS # BLD: 0.1 K/UL (ref 0–0.1)
BASOPHILS NFR BLD: 1 % (ref 0–2)
BILIRUB SERPL-MCNC: 0.3 MG/DL (ref 0.2–1)
BUN SERPL-MCNC: 20 MG/DL (ref 7–18)
BUN/CREAT SERPL: 50 (ref 12–20)
CALCIUM SERPL-MCNC: 8.9 MG/DL (ref 8.5–10.1)
CHLORIDE SERPL-SCNC: 103 MMOL/L (ref 100–111)
CO2 SERPL-SCNC: 30 MMOL/L (ref 21–32)
CREAT SERPL-MCNC: 0.4 MG/DL (ref 0.6–1.3)
DIFFERENTIAL METHOD BLD: ABNORMAL
EKG ATRIAL RATE: 72 BPM
EKG DIAGNOSIS: NORMAL
EKG P AXIS: 75 DEGREES
EKG P-R INTERVAL: 124 MS
EKG Q-T INTERVAL: 402 MS
EKG QRS DURATION: 72 MS
EKG QTC CALCULATION (BAZETT): 440 MS
EKG R AXIS: 53 DEGREES
EKG T AXIS: 75 DEGREES
EKG VENTRICULAR RATE: 72 BPM
EOSINOPHIL # BLD: 0.1 K/UL (ref 0–0.4)
EOSINOPHIL NFR BLD: 1 % (ref 0–5)
ERYTHROCYTE [DISTWIDTH] IN BLOOD BY AUTOMATED COUNT: 12.8 % (ref 11.6–14.5)
GLOBULIN SER CALC-MCNC: 3.1 G/DL (ref 2–4)
GLUCOSE SERPL-MCNC: 113 MG/DL (ref 74–99)
HCT VFR BLD AUTO: 34.3 % (ref 35–45)
HGB BLD-MCNC: 11.1 G/DL (ref 12–16)
IMM GRANULOCYTES # BLD AUTO: 0.1 K/UL (ref 0–0.04)
IMM GRANULOCYTES NFR BLD AUTO: 0 % (ref 0–0.5)
LYMPHOCYTES # BLD: 1.2 K/UL (ref 0.9–3.6)
LYMPHOCYTES NFR BLD: 10 % (ref 21–52)
MCH RBC QN AUTO: 29.5 PG (ref 24–34)
MCHC RBC AUTO-ENTMCNC: 32.4 G/DL (ref 31–37)
MCV RBC AUTO: 91.2 FL (ref 78–100)
MONOCYTES # BLD: 0.6 K/UL (ref 0.05–1.2)
MONOCYTES NFR BLD: 5 % (ref 3–10)
NEUTS SEG # BLD: 9.8 K/UL (ref 1.8–8)
NEUTS SEG NFR BLD: 84 % (ref 40–73)
NRBC # BLD: 0 K/UL (ref 0–0.01)
NRBC BLD-RTO: 0 PER 100 WBC
PLATELET # BLD AUTO: 200 K/UL (ref 135–420)
PMV BLD AUTO: 11 FL (ref 9.2–11.8)
POTASSIUM SERPL-SCNC: 4.1 MMOL/L (ref 3.5–5.5)
PROT SERPL-MCNC: 6.3 G/DL (ref 6.4–8.2)
RBC # BLD AUTO: 3.76 M/UL (ref 4.2–5.3)
SODIUM SERPL-SCNC: 139 MMOL/L (ref 136–145)
TROPONIN I SERPL HS-MCNC: 9 NG/L (ref 0–54)
WBC # BLD AUTO: 11.8 K/UL (ref 4.6–13.2)

## 2024-05-27 PROCEDURE — 84484 ASSAY OF TROPONIN QUANT: CPT

## 2024-05-27 PROCEDURE — 72125 CT NECK SPINE W/O DYE: CPT

## 2024-05-27 PROCEDURE — 70450 CT HEAD/BRAIN W/O DYE: CPT

## 2024-05-27 PROCEDURE — 93005 ELECTROCARDIOGRAM TRACING: CPT | Performed by: PHYSICIAN ASSISTANT

## 2024-05-27 PROCEDURE — 6360000002 HC RX W HCPCS: Performed by: PHYSICIAN ASSISTANT

## 2024-05-27 PROCEDURE — 96374 THER/PROPH/DIAG INJ IV PUSH: CPT

## 2024-05-27 PROCEDURE — 80053 COMPREHEN METABOLIC PANEL: CPT

## 2024-05-27 PROCEDURE — 2580000003 HC RX 258: Performed by: PHYSICIAN ASSISTANT

## 2024-05-27 PROCEDURE — 85025 COMPLETE CBC W/AUTO DIFF WBC: CPT

## 2024-05-27 PROCEDURE — 70486 CT MAXILLOFACIAL W/O DYE: CPT

## 2024-05-27 PROCEDURE — 6370000000 HC RX 637 (ALT 250 FOR IP): Performed by: PHYSICIAN ASSISTANT

## 2024-05-27 PROCEDURE — 99285 EMERGENCY DEPT VISIT HI MDM: CPT

## 2024-05-27 RX ORDER — 0.9 % SODIUM CHLORIDE 0.9 %
500 INTRAVENOUS SOLUTION INTRAVENOUS ONCE
Status: COMPLETED | OUTPATIENT
Start: 2024-05-27 | End: 2024-05-27

## 2024-05-27 RX ORDER — AMOXICILLIN AND CLAVULANATE POTASSIUM 500; 125 MG/1; MG/1
1 TABLET, FILM COATED ORAL ONCE
Status: COMPLETED | OUTPATIENT
Start: 2024-05-27 | End: 2024-05-27

## 2024-05-27 RX ORDER — AMOXICILLIN AND CLAVULANATE POTASSIUM 875; 125 MG/1; MG/1
1 TABLET, FILM COATED ORAL 2 TIMES DAILY
Qty: 10 TABLET | Refills: 0 | Status: SHIPPED | OUTPATIENT
Start: 2024-05-27 | End: 2024-06-01

## 2024-05-27 RX ORDER — ONDANSETRON 2 MG/ML
4 INJECTION INTRAMUSCULAR; INTRAVENOUS
Status: COMPLETED | OUTPATIENT
Start: 2024-05-27 | End: 2024-05-27

## 2024-05-27 RX ORDER — ACETAMINOPHEN 325 MG/1
650 TABLET ORAL
Status: COMPLETED | OUTPATIENT
Start: 2024-05-27 | End: 2024-05-27

## 2024-05-27 RX ADMIN — ONDANSETRON 4 MG: 2 INJECTION INTRAMUSCULAR; INTRAVENOUS at 15:22

## 2024-05-27 RX ADMIN — SODIUM CHLORIDE 500 ML: 9 INJECTION, SOLUTION INTRAVENOUS at 14:14

## 2024-05-27 RX ADMIN — ACETAMINOPHEN 650 MG: 325 TABLET ORAL at 15:22

## 2024-05-27 RX ADMIN — AMOXICILLIN AND CLAVULANATE POTASSIUM 1 TABLET: 500; 125 TABLET, FILM COATED ORAL at 15:53

## 2024-05-27 ASSESSMENT — PAIN SCALES - GENERAL: PAINLEVEL_OUTOF10: 10

## 2024-05-27 ASSESSMENT — PAIN - FUNCTIONAL ASSESSMENT
PAIN_FUNCTIONAL_ASSESSMENT: 0-10
PAIN_FUNCTIONAL_ASSESSMENT: NONE - DENIES PAIN

## 2024-05-27 ASSESSMENT — PAIN DESCRIPTION - LOCATION: LOCATION: GENERALIZED

## 2024-05-27 NOTE — ED TRIAGE NOTES
From [facility], per staff reports un witness fall, denies LOC. Reports having head first fall, has noted plum sized hematoma. No hx of coagulation therapy. AXOX4. No other adverse bleeding noted.

## 2024-05-27 NOTE — ED PROVIDER NOTES
known as: ATROVENT     loperamide 2 MG capsule  Commonly known as: IMODIUM     melatonin 3 MG Tabs tablet     OLANZapine 10 MG tablet  Commonly known as: ZYPREXA     oxyCODONE 5 MG immediate release tablet  Commonly known as: ROXICODONE     sertraline 100 MG tablet  Commonly known as: ZOLOFT     traZODone 100 MG tablet  Commonly known as: DESYREL     ziprasidone 60 MG capsule  Commonly known as: GEODON               Where to Get Your Medications        Information about where to get these medications is not yet available    Ask your nurse or doctor about these medications  amoxicillin-clavulanate 875-125 MG per tablet                I am the Primary Clinician of Record.       (Please note that parts of this dictation were completed with voice recognition software. Quite often unanticipated grammatical, syntax, homophones, and other interpretive errors are inadvertently transcribed by the computer software. Please disregards these errors. Please excuse any errors that have escaped final proofreading.)       Farhana Barker PA-C  05/27/24 2049

## 2024-05-27 NOTE — ED NOTES
Attempted to change client and remove purwick. Client combative, saving leave me alone, requesting all of her clothes and reminded, she was only missing a top. Client had on pants. Client began to cry repeat ly, informed of desire to be left alone and be taken away. Able to remove  purwick and IV with additional assistance. Client tearfull.